# Patient Record
Sex: MALE | Race: WHITE | NOT HISPANIC OR LATINO | Employment: OTHER
[De-identification: names, ages, dates, MRNs, and addresses within clinical notes are randomized per-mention and may not be internally consistent; named-entity substitution may affect disease eponyms.]

---

## 2022-05-06 ENCOUNTER — TRANSCRIBE ORDERS (OUTPATIENT)
Dept: OTHER | Age: 67
End: 2022-05-06
Payer: COMMERCIAL

## 2022-05-06 DIAGNOSIS — R63.4 WEIGHT LOSS OF MORE THAN 10% BODY WEIGHT: Primary | ICD-10-CM

## 2022-05-06 DIAGNOSIS — R11.10 REGURGITATION OF FOOD: ICD-10-CM

## 2022-05-06 DIAGNOSIS — K22.0 ACHALASIA: ICD-10-CM

## 2022-05-09 ENCOUNTER — TELEPHONE (OUTPATIENT)
Dept: GASTROENTEROLOGY | Facility: CLINIC | Age: 67
End: 2022-05-09
Payer: COMMERCIAL

## 2022-05-09 NOTE — TELEPHONE ENCOUNTER
[facility]    Phone Message    May a detailed message be left on voicemail: yes     Reason for Call: Other: New patient // scheduled for 11-1 // weight loss over 10% of body weight per referral - patient unaware of how much lost in what time period      Action Taken: Message routed to:  [facility] ([facility] GI    Travel Screening: Not Applicable

## 2022-05-12 NOTE — TELEPHONE ENCOUNTER
Chart review office note from 5/5    Lisandro is a 67 year old. The primary encounter diagnosis was Regurgitation of food. Diagnoses of Esophageal disorder, Achalasia, Weight loss of more than 10% body weight, Leg wound, right, initial encounter, and Bronchitis were also pertinent to this visit.     1. Regurgitation of food  2. Esophageal disorder  3. Achalasia  4. Weight loss of more than 10% body weight  Presentation initially concerning for malignancy. CT chest abdomen pelvis did not reveal evidence of mass. Severely dilated distal esophagus. Looking back in his chart, it appears he was diagnosed with Achalasia back in 2011 by Dr. Willard in [place]. I spoke with Dr. Mohs, general surgery, about performing endoscopy but he referred me to Gastroenterology. I then spoke with Dr. Salgado, Gastroenterology, who stated that pt would likely need a myotomy and to refer urgently to [facility] for work-up and treatment.     Referral order placed for [facility].

## 2022-05-16 ENCOUNTER — TELEPHONE (OUTPATIENT)
Dept: GASTROENTEROLOGY | Facility: CLINIC | Age: 67
End: 2022-05-16
Payer: COMMERCIAL

## 2022-05-16 NOTE — TELEPHONE ENCOUNTER
LVM with pod number. Pt's 11/1 appt with Dr. Grace can be rescheduled to 5/26 at 2:40pm, OK per RN. Slot is held for patient.  (no mychart)

## 2022-05-17 ENCOUNTER — TELEPHONE (OUTPATIENT)
Dept: GASTROENTEROLOGY | Facility: CLINIC | Age: 67
End: 2022-05-17
Payer: COMMERCIAL

## 2022-05-17 NOTE — TELEPHONE ENCOUNTER
Called to remind patient of their upcoming appointment with our GI clinic, on Thursday 5/26/2022 at 2:40PM with Dr. Grace. This appointment is scheduled as a video visit. You will receive a call approximately 30 minutes prior to check you in, you must be in [place] for this visit., if your appointment is virtual (video or telephone) you need to be in [place] for the visit. To reschedule or cancel patient to call [phone].    Nyla Monroy MA

## 2022-05-18 ENCOUNTER — TELEPHONE (OUTPATIENT)
Dept: GASTROENTEROLOGY | Facility: CLINIC | Age: 67
End: 2022-05-18
Payer: COMMERCIAL

## 2022-05-18 NOTE — TELEPHONE ENCOUNTER
Called to remind patient of their upcoming appointment with our GI clinic, on Thursday 5/26/2022 at 2:40PM with Dr. Grace. This appointment is scheduled as a video visit. You will receive a call approximately 30 minutes prior to check you in, you must be in [place] for this visit., if your appointment is virtual (video or telephone) you need to be in [place] for the visit. To reschedule or cancel patient to call [phone].     Nyla Monroy MA

## 2022-05-18 NOTE — TELEPHONE ENCOUNTER
REFERRAL INFORMATION:    Referring Provider:  Ginger    Referring Clinic:  [facility]    Reason for Visit/Diagnosis: achalasia, weight loss     FUTURE VISIT INFORMATION:    Appointment Date: 5.26.22    Appointment Time: 2:40 PM     NOTES STATUS DETAILS   OFFICE NOTE from Referring Provider Care Everywhere 5.6.22[facility]   OFFICE NOTE from Other Specialist Care Everywhere 5.12.22 [facility]   HOSPITAL DISCHARGE SUMMARY/  ED VISITS Care Everywhere 5.12.22 [facility]   OPERATIVE REPORT N/A    MEDICATION LIST Care Everywhere         ENDOSCOPY  Care Everywhere 5.13.22 EGD  10.22.20 [facility]   COLONOSCOPY N/A    ERCP N/A    EUS N/A    STOOL TESTING N/A    PERTINENT LABS Care Everywhere    PATHOLOGY REPORTS (RELATED) N/A    IMAGING (CT, MRI, EGD, MRCP, Small Bowel Follow Through/SBT, MR/CT Enterography) Received 5.13.22, 5.13.22 XR esophagram, [facility]  5.5.22 CT chest ab pelvis, [facility]  8.31.20 CT ab pelvis, [facility]     Action 5.18.22 MJ   Action Taken Requested images from [facility]     Action 5.25.22 MJ   Action Taken Many images pushed to PACS. Called film room to have images pulled into PACS.

## 2022-05-19 ENCOUNTER — PATIENT OUTREACH (OUTPATIENT)
Dept: GASTROENTEROLOGY | Facility: CLINIC | Age: 67
End: 2022-05-19
Payer: COMMERCIAL

## 2022-05-19 NOTE — TELEPHONE ENCOUNTER
Reached out to pt per clinic staff to let them know that they will not need to complete any testing prior to their appt with Dr Grace on 5/26  Full understanding verbalized

## 2022-05-26 ENCOUNTER — VIRTUAL VISIT (OUTPATIENT)
Dept: GASTROENTEROLOGY | Facility: CLINIC | Age: 67
End: 2022-05-26
Attending: PHYSICIAN ASSISTANT
Payer: COMMERCIAL

## 2022-05-26 ENCOUNTER — PRE VISIT (OUTPATIENT)
Dept: GASTROENTEROLOGY | Facility: CLINIC | Age: 67
End: 2022-05-26

## 2022-05-26 VITALS — HEART RATE: 90 BPM | SYSTOLIC BLOOD PRESSURE: 98 MMHG | WEIGHT: 205 LBS | DIASTOLIC BLOOD PRESSURE: 64 MMHG

## 2022-05-26 DIAGNOSIS — R11.10 REGURGITATION OF FOOD: ICD-10-CM

## 2022-05-26 DIAGNOSIS — R63.4 WEIGHT LOSS OF MORE THAN 10% BODY WEIGHT: ICD-10-CM

## 2022-05-26 DIAGNOSIS — K22.0 ACHALASIA: ICD-10-CM

## 2022-05-26 DIAGNOSIS — R13.19 ESOPHAGEAL DYSPHAGIA: Primary | ICD-10-CM

## 2022-05-26 PROCEDURE — 99205 OFFICE O/P NEW HI 60 MIN: CPT | Mod: 95 | Performed by: INTERNAL MEDICINE

## 2022-05-26 RX ORDER — DILTIAZEM HCL 60 MG
60 TABLET ORAL 3 TIMES DAILY
COMMUNITY
Start: 2022-05-17

## 2022-05-26 RX ORDER — PHENOL 1.4 %
10 AEROSOL, SPRAY (ML) MUCOUS MEMBRANE 2 TIMES DAILY
COMMUNITY

## 2022-05-26 RX ORDER — DULOXETIN HYDROCHLORIDE 60 MG/1
60 CAPSULE, DELAYED RELEASE ORAL DAILY
COMMUNITY
Start: 2022-05-19 | End: 2023-04-20

## 2022-05-26 ASSESSMENT — PAIN SCALES - GENERAL: PAINLEVEL: NO PAIN (0)

## 2022-05-26 NOTE — PROGRESS NOTES
"Lisandro is a 67 year old who is being evaluated via a billable video visit.      How would you like to obtain your AVS? MyChart  If the video visit is dropped, the invitation should be resent by: Send to e-mail at: [email or web address]  Will anyone else be joining your video visit? No      Video Start Time: 2:18 PM  Video-Visit Details    Type of service:  Video Visit    Video End Time:3:08 PM    Originating Location (pt. Location): Home    Distant Location (provider location):  [facility] [place]     Platform used for Video Visit: App[employer]     Gastroenterology Visit for: Lisandro Damon 1955   MRN: [id number]     Reason for Visit:  chief complaint    Referred by: [place]  / No address on file  No care team member to display    History of Present Illness:   Lisandro Damon is a 67 year old male with DVT 2013, cardiomyopathy, paroxismal afib, HTN, anxiety/depression,  who is presenting as a new patient in consultation at the request of Dr. Cannon with a chief complaint of dysphagia, regurgitation, reported achalasia.  ---------------------------------------------------------------  Lisandro Damon states through his wife. In 2011 they feel symptoms started. He began having trouble swallowing but it was \"not terrible\". At that time he was told he had achalasia. He had different medications over the years. Has been recently having increasing regurgitation of all contents and recent aspiration pneumonias requiring hospitalization. Has to take pills carefully to not have them become stuck. If he wasn't avoiding solid foods he would have more frequent regurgitation. He currently avoids all solid food due to the dysphagia and regurgitation associated.    The HRM machine is broken locally per the patient's wife's report. Feels dilation was marginally helpful in terms of being able to get liquid nutrition down.   ---------------------------------------------------------------     Lisandro Damon" denies  odynophagia, heartburn/reflux, nausea, vomiting, early satiety, abdominal pain, abdominal distension/bloating, diarrhea, constipation, hematochezia, or melena. No unintentional weight loss.     Wt Readings from Last 5 Encounters:   05/26/22 93 kg (205 lb)        Esophageal Questionnaire(s)    BEDQ Questionnaire  BEDQ Questionnaire: How Often Have You Had the Following? 5/26/2022   Trouble eating solid food (meat, bread, vegetables) 5.0   Trouble eating soft foods (yogurt, jello, pudding) 0   Trouble swallowing liquids 0   Pain while swallowing 0   Coughing or choking while swallowing foods or liquids 0   Total Score: 5     BEDQ Questionnaire: Discomfort/Pain Ratings 5/26/2022   Eating solid food (meat, bread, vegetables) 5.0   Eating soft foods (yogurt, jello, pudding) 0   Drinking liquid 0   Total Score: 5       Eckardt Questionnaire  Eckardt Questionnaire 5/26/2022   Dysphagia 3   Regurgitation 1   Retrosternal Pain 0   Weight Loss (kg) 3   Total Score:  7       Promis 10 Questionnaire  No flowsheet data found.        STUDIES & PROCEDURES:    EGD:   Date: 5/13/22  Impression:  The gastroscope was then passed entirely under direct vision. His proximal and distal esophagus were remarkable for a slight stricture that I was able to pass the scope through without much difficulty. His distal stomach revealed a gastritis that did obtain an antral biopsy, as well as a biopsy for a ARCHIE test. His 1st and 2nd portions of the duodenum were normal. I withdrew the scope back into the stomach, tried multiple times to pass the balloon dilator through the scope, even switched scopes, switched dilators, was unable to do that no matter what I tried, so I ended up dilating it with some bougies up to a 54-Wolof bougie. This was done gently and without difficulty. I reinserted the scope, he had no apparent injury to his esophagus. However, I am recommending a Gastroview swallow just to make sure he has no esophageal  injury.     PROCEDURE: Esophagogastroduodenoscopy with biopsy of the antrum, as well as biopsy for ARCHIE test and a bougie dilation of his esophageal stricture secondary to the balloons being defective.     Pathology Report:    Colonoscopy:  Date:  Impression:  Pathology Report:     EndoFLIP directed at the UES or LES (8cm (EF-325) balloon length or 16cm (EF-322) balloon length):   Date:  8cm balloon  Balloon inflation Balloon pressure CSA (mm^2) DI (mm^2/mmHg) Dmin (mm) Compliance   20 (ladmark ID)        30        40        50           16cm balloon  Balloon inflation Balloon pressure CSA (mm^2) DI (mm^2/mmHg) Dmin (mm) Compliance   30 (ladmark ID)        40        50        60        70           High Resolution Manometry:  Date:  Impression:    PH/Impedance:  Date:  Impression:     Bravo:  48 or 96hr  Date:  Impression:    CT:  Date: 5/5/22  Impression:  LUNGS AND LARGE AIRWAYS: Presumed mucous within the trachea. Diffuse bronchial wall thickening both lungs suggests bronchitis/bronchiolitis. Mild reticulonodular tree-in-bud opacities both lungs suggest sequela of airways inflammation/infection. Aspiration is a possibility.    PLEURA: Normal. No pleural effusion or thickening.    HEART AND PERICARDIUM: Borderline heart size. Coronary artery calcification.    MEDIASTINUM AND YULI: Fluid distended esophagus extending to the distal esophagus. Consider endoscopy for further evaluation.    CHEST WALL AND LOWER NECK: Bilateral gynecomastia.    VESSELS: Scattered arterial calcifications.    BONES: Diffuse osteopenia. Degenerative changes thoracic spine and both shoulders.      ABDOMEN/PELVIS:    LIVER: Normal.    GALLBLADDER AND BILIARY TREE: Cholecystectomy.    PANCREAS: Normal.    SPLEEN: Normal.    ADRENALS: Normal.    KIDNEYS AND URETERS: Normal.    BLADDER: Calcification involving the bladder likely related to bladder stone. If indicated, urology consultation could be considered.    REPRODUCTIVE ORGANS: Moderate  prostate enlargement.    BOWEL: Normal appendix. Normal caliber small bowel and colon.    PERITONEUM: No ascites or free air. No other fluid collection.    VESSELS: Atheromatous plaque involving the abdominal aorta and branch vessels.    RETROPERITONEUM: Normal.    ABDOMINAL WALL: Normal.    BONES: Postoperative changes kyphoplasty at L1 and L2 about moderate and severe compression deformities respectively. Left DIANELYS. Degenerative changes lumbar spine, SI joints and right hip.    LYMPH NODES:    --RETROPERITONEAL: No enlarged retroperitoneal lymph nodes.    --MESENTERIC: No enlarged mesenteric lymph nodes.    --PELVIC/INGUINAL: No enlarged pelvic lymph nodes.        IMPRESSION:   1. Diffuse bronchial wall thickening both lungs. Along with scattered reticulonodular tree-in-bud interstitial prominence, findings suggest airways inflammation or infection. Accounting for fluid distention of the esophagus, aspiration is a possibility.  2. Fluid distended esophagus extending to the distal esophagus. Recommend endoscopy for further evaluation.  3. Additional findings as above.    Esophagram:  Date:    Prior medical records were reviewed including, but not limited to, notes from referring providers, lab work, radiographic tests, and other diagnostic tests. Pertinent results were summarized above.     History   No past medical history on file.    No past surgical history on file.    Social History     Socioeconomic History     Marital status:      Spouse name: Not on file     Number of children: Not on file     Years of education: Not on file     Highest education level: Not on file   Occupational History     Not on file   Tobacco Use     Smoking status: Never Smoker     Smokeless tobacco: Former User     Quit date: 5/2/2011   Substance and Sexual Activity     Alcohol use: Not on file     Drug use: Not on file     Sexual activity: Not on file   Other Topics Concern     Not on file   Social History Narrative     Not on  file     Social Determinants of Health     Financial Resource Strain: Not on file   Food Insecurity: Not on file   Transportation Needs: Not on file   Physical Activity: Not on file   Stress: Not on file   Social Connections: Not on file   Intimate Partner Violence: Not on file   Housing Stability: Not on file       No family history on file.  Family history reviewed and edited as appropriate    Medications and Allergies:     Outpatient Encounter Medications as of 5/26/2022   Medication Sig Dispense Refill     aspirin (ASA) 81 MG EC tablet Take 81 mg by mouth daily       diclofenac (VOLTAREN) 1 % topical gel Apply 4 g topically       diltiazem (CARDIZEM) 60 MG tablet Take 60 mg by mouth 3 times daily       DULoxetine (CYMBALTA) 60 MG capsule Take 60 mg by mouth daily       magnesium chloride 535 (64 Mg) MG TBEC CR tablet Take 64 mg by mouth daily       Melatonin 10 MG TABS tablet Take 10 mg by mouth 2 times daily       No facility-administered encounter medications on file as of 5/26/2022.        No Known Allergies     Review of systems:  A full 10 point review of systems was obtained and was negative except for the pertinent positives and negatives stated within the HPI.    Objective Findings:   Physical Exam:    Constitutional: BP 98/64   Pulse 90   Wt 93 kg (205 lb)   General: Alert, cooperative, no distress, well-appearing  Head: Atraumatic, normocephalic, no obvious abnormalities   Eyes: EOMI, Sclera anicteric, no obvious conjunctival hemorrhage   Nose: Nares normal, no obvious malformation, no obvious rhinorrhea   Respiratory: normal appearing respirations, no cough  Musculoskeletal: Range of motion intact, no obvious strength deficit  Skin: No jaundice, no obvious rash  Neurologic: AAOx3, no obvious neurologic abnormality  Psychiatric: blunted Affect, appropriate mood, defers to wife to relay his HPI  Extremities: No obvious edema, no obvious malformation     Labs, Radiology, Pathology     No results found  for: WBC, HGB, PLT, CHOL, TRIG, HDL, LDLDIRECT, ALT, AST, NA, CREATININE, BUN, CO2, TSH, INR, GLUF     Liver Function Studies - No results for input(s): PROTTOTAL, ALBUMIN, BILITOTAL, ALKPHOS, AST, ALT, BILIDIRECT in the last 34120 hours.       Patient Active Problem List    Diagnosis Date Noted     Weight loss of more than 10% body weight 05/26/2022     Priority: Medium     Regurgitation of food 05/26/2022     Priority: Medium     Achalasia 05/26/2022     Priority: Medium     Esophageal dysphagia 05/26/2022     Priority: Medium      Assessment and Plan   Assessment:    Lisandro Damon is a 67 year old male with DVT 2013, cardiomyopathy, paroxismal afib, HTN, anxiety/depression,  who is presenting as a new patient in consultation at the request of Dr. Cannon with a chief complaint of dysphagia, regurgitation, reported achalasia    The patient was seen in video telemedicine consultation regarding his symptoms of dysphagia to solids, regurgitation, and reported prior diagnosis of achalasia     The pathophysiology of achalasia was explained as were the subtypes (I, II, and III). The evaluation with high resolution esophageal manometry was described as was the possible need for endoFLIP. The different treatment options were explained in detail including otulinum toxin injection, pneumatic dilation, hydrostatic dilation (EsoFLIP), laparoscopic heller myotomy (LHM), and per-oral endoscopic myotomy (POEM).    In order to best assess next steps in management the patient will require relatively urgent high resolution manometry due to his aspiration events. We will coordinate the manometry with him and his wife to ensure this fits their schedule. Assuming achalasia is confirmed, the appropriate referral will be placed for POEM or LHM.     Note* pseudoachalasia has been ruled out with a CT scan performed earlier this month.     1. Esophageal dysphagia    2. Regurgitation of food    3. Weight loss of more than 10% body weight     4. Achalasia       Orders Placed This Encounter   Procedures     Adult Gastro Ref - Procedure Only          Plan:  1. Please obtain high resolution esophageal manometry here at the [facility]. This will be important to characterize your esophageal disorder (achalasia I, II, or III, or other motility abnormality)  2. Possible treatment options for achalasia include: botulinum toxin injection, pneumatic dilation, hydrostatic dilation (EsoFLIP), laparoscopic heller myotomy (LHM), and per-oral endoscopic myotomy (POEM). Here is a helpful link describing POEM: https://[email or web address]. Please take your time to review these possible treatment options.     Follow up plan:   Return to clinic 3 months and as needed.    The risks and benefits of my recommendations, as well as other treatment options were discussed with the patient and any available family today. All questions were answered.     o Follow up: As planned above. Today, I personally spent 50 minutes in direct face to face time with the patient, of which greater than 50% of the time was spent in patient education and counseling as described above. Approximately 20 minutes were spent on indirect care associated with the patient's consultation including but not limited to review of: patient medical records to date, clinic visits, hospital records, lab results, imaging studies, procedural documentation, and coordinating care with other providers. The findings from this review are summarized in the above note. All of the above accounted for a cumulative time of 70 minutes and was performed on the date of service.     The patient verbalized understanding of the plan and was appreciative for the time spent and information provided during the office visit.     Author:   Joaquin Grace DO   of Medicine  Director, Esophageal Disorders Program  Division of Gastroenterology, Hepatology, and Nutrition  [facility]  [place]     Documentation assisted by voice recognition and documentation system.

## 2022-05-26 NOTE — PATIENT INSTRUCTIONS
It was a pleasure taking care of you today.  I've included a brief summary of our discussion and care plan from today's visit below.  Please review this information with your primary care provider.  _______________________________________________________________________    My recommendations are summarized as follows:    Please obtain high resolution esophageal manometry here at the [facility]. This will be important to characterize your esophageal disorder (achalasia I, II, or III, or other motility abnormality)  Possible treatment options for achalasia include: botulinum toxin injection, pneumatic dilation, hydrostatic dilation (EsoFLIP), laparoscopic heller myotomy (LHM), and per-oral endoscopic myotomy (POEM). Here is a helpful link describing POEM: https://[email or web address]. Please take your time to review these possible treatment options.       To schedule endoscopic procedures you may call: [phone]  To schedule radiology tests you may call: [phone]  To schedule an ENT appointment you may call: [phone]    Please call my nurse Luiza ([phone])Katie ([phone]) with any questions or concerns.  If you were seen through the [facility] please feel free to reach out to Domi at [phone]   --    Return to GI Clinic in 3 months to review your progress.    _______________________________________________________________________    Who do I call with any questions after my visit?  Please be in touch if there are any further questions that arise following today's visit.  There are multiple ways to contact your gastroenterology care team.      During business hours, you may reach a Gastroenterology nurse at [phone] and choose option 3.       To schedule or reschedule an appointment, please call [phone].     You can always send a secure message through [employer].  [employer] messages are answered by your nurse or doctor typically within 24 hours.  Please allow extra  time on weekends and holidays.      For urgent/emergent questions after business hours, you may reach the on-call GI Fellow by contacting the [facility]  at [phone].     How will I get the results of any tests ordered?    You will receive all of your results.  If you have signed up for [employer], any tests ordered at your visit will be available to you after your physician reviews them.  Typically this takes 1-2 weeks.  If there are urgent results that require a change in your care plan, your physician or nurse will call you to discuss the next steps.      What is [employer]ip[employer] Art[employer]art?  Mustbin is a secure way for you to access all of your healthcare records from the [facility].  It is a web based computer program, so you can sign on to it from any location.  It also allows you to send secure messages to your care team.  I recommend signing up for [employer] access if you have not already done so and are comfortable with using a computer.      How to I schedule a follow-up visit?  If you did not schedule a follow-up visit today, please call [phone] to schedule a follow-up office visit.      If you feel you received exceptional care and are interested in supporting the clinical and research goals of Dr. Grace or the Division of Gastroenterology, Hepatology, and Nutrition please contact [email or web address] from the [facility] to discuss opportunities to donate.    Sincerely,    Joaquin Grace DO   of Medicine  Director, Esophageal Disorders Program  Division of Gastroenterology, Hepatology, and Nutrition  [facility]

## 2022-05-29 ENCOUNTER — HEALTH MAINTENANCE LETTER (OUTPATIENT)
Age: 67
End: 2022-05-29

## 2022-06-08 ENCOUNTER — PATIENT OUTREACH (OUTPATIENT)
Dept: GASTROENTEROLOGY | Facility: CLINIC | Age: 67
End: 2022-06-08
Payer: COMMERCIAL

## 2022-06-08 NOTE — TELEPHONE ENCOUNTER
Reached out to pt per provider to assist in scheduling esophageal manometry.  Pt requesting to schedule in August right before their follow up visit.  They will cancel this if they are able to have the test done in [place].  Transferred pt and spouse to endoscopy scheduling for making the appointment.

## 2022-06-23 ENCOUNTER — PATIENT OUTREACH (OUTPATIENT)
Dept: GASTROENTEROLOGY | Facility: CLINIC | Age: 67
End: 2022-06-23

## 2022-06-23 NOTE — PROGRESS NOTES
Reached out to pt to offer sooner esophageal manometry appointment per care plan. Pt declines and will keep August appointment.

## 2022-07-15 ENCOUNTER — TELEPHONE (OUTPATIENT)
Dept: GASTROENTEROLOGY | Facility: CLINIC | Age: 67
End: 2022-07-15

## 2022-07-15 NOTE — TELEPHONE ENCOUNTER
Spoke with Lisandro's spouse as I noticed he cancelled his 8/16 manomtery test. She stated he was able to get this done next Tuesday 7/19 in [place] at [facility] which is much closer for them to do then drive to [facility] to have done.

## 2022-07-19 ENCOUNTER — TRANSFERRED RECORDS (OUTPATIENT)
Dept: HEALTH INFORMATION MANAGEMENT | Facility: CLINIC | Age: 67
End: 2022-07-19

## 2022-08-29 ENCOUNTER — VIRTUAL VISIT (OUTPATIENT)
Dept: GASTROENTEROLOGY | Facility: CLINIC | Age: 67
End: 2022-08-29
Payer: COMMERCIAL

## 2022-08-29 DIAGNOSIS — R13.19 ESOPHAGEAL DYSPHAGIA: Primary | ICD-10-CM

## 2022-08-29 PROCEDURE — 99213 OFFICE O/P EST LOW 20 MIN: CPT | Mod: 95 | Performed by: INTERNAL MEDICINE

## 2022-08-29 NOTE — PATIENT INSTRUCTIONS
It was a pleasure taking care of you today.  I've included a brief summary of our discussion and care plan from today's visit below.  Please review this information with your primary care provider.  _______________________________________________________________________    My recommendations are summarized as follows:    Please schedule an upper endoscopy with endoFLIP  We will try to obtain the report of your manometry in the meantime  If you have achalasia you may wish to consider the heller myotomy vs POEM.   Possible treatment options for achalasia include: botulinum toxin injection, pneumatic dilation, hydrostatic dilation (EsoFLIP), laparoscopic heller myotomy (LHM), and per-oral endoscopic myotomy (POEM). Here is a helpful link describing POEM: https://[email or web address]. Please take your time to review these possible treatment options.       To schedule endoscopic procedures you may call: [phone]  To schedule radiology tests you may call: [phone]  To schedule an ENT appointment you may call: [phone]    Please call my nurse Luiza ([phone])Katie ([phone]) with any questions or concerns.  If you were seen through the [facility] please feel free to reach out to Domi at [phone]   --    Return to GI Clinic in 4 months to review your progress.    _______________________________________________________________________    Who do I call with any questions after my visit?  Please be in touch if there are any further questions that arise following today's visit.  There are multiple ways to contact your gastroenterology care team.      During business hours, you may reach a Gastroenterology nurse at [phone] and choose option 3.       To schedule or reschedule an appointment, please call [phone].     You can always send a secure message through Baloon[employer].  [employer] messages are answered by your nurse or doctor typically within 24 hours.  Please allow extra time on weekends  and holidays.      For urgent/emergent questions after business hours, you may reach the on-call GI Fellow by contacting the [facility]  at [phone].     How will I get the results of any tests ordered?    You will receive all of your results.  If you have signed up for [employer], any tests ordered at your visit will be available to you after your physician reviews them.  Typically this takes 1-2 weeks.  If there are urgent results that require a change in your care plan, your physician or nurse will call you to discuss the next steps.      What is [employer]?  Gen9 is a secure way for you to access all of your healthcare records from the [facility].  It is a web based computer program, so you can sign on to it from any location.  It also allows you to send secure messages to your care team.  I recommend signing up for Gen9 access if you have not already done so and are comfortable with using a computer.      How to I schedule a follow-up visit?  If you did not schedule a follow-up visit today, please call [phone] to schedule a follow-up office visit.      If you feel you received exceptional care and are interested in supporting the clinical and research goals of Dr. Grace or the Division of Gastroenterology, Hepatology, and Nutrition please contact [email or web address] from the [facility] to discuss opportunities to donate.    Sincerely,    Joaquin Grace DO   of Medicine  Director, Esophageal Disorders Program  Division of Gastroenterology, Hepatology, and Nutrition  [facility]

## 2022-08-29 NOTE — PROGRESS NOTES
"Lisandro is a 67 year old who is being evaluated via a billable telephone visit.      What phone number would you like to be contacted at? [phone]  How would you like to obtain your AVS? Omar  Phone call duration: 18 minutes      Gastroenterology Visit for: Lisandro Damon 1955   MRN: [id number]     Reason for Visit:  chief complaint    Referred by: Banquete  / No address on file  Patient Care Team:  Joaquin Grace DO as Assigned Gastroenterology Provider    History of Present Illness:   Lisandro Damon is a 67 year old male with DVT 2013, cardiomyopathy, paroxismal afib, HTN, anxiety/depression,  who is presenting as a follow up patient in consultation at the request of Dr. Cannon with a chief complaint of dysphagia, regurgitation, reported achalasia.    8/29/22  Patient did not communicate significantly. His wife did most of the speaking with minimal input from the patient. He underwent manometry and reports that all swallows were upright however documentation suggests that he was in the supine position (head of bed 45 degree angle). He has no meaningful change in symptoms. See BEDQ and Eckardt score below.   ---------------------------------------------------------------  5/26/22  Lisandro Damon states through his wife. In 2011 they feel symptoms started. He began having trouble swallowing but it was \"not terrible\". At that time he was told he had achalasia. He had different medications over the years. Has been recently having increasing regurgitation of all contents and recent aspiration pneumonias requiring hospitalization. Has to take pills carefully to not have them become stuck. If he wasn't avoiding solid foods he would have more frequent regurgitation. He currently avoids all solid food due to the dysphagia and regurgitation associated.    The HRM machine is broken locally per the patient's wife's report. Feels dilation was marginally helpful in terms of being able to get liquid nutrition down. "   ---------------------------------------------------------------     Lisandro Damon denies  odynophagia, heartburn/reflux, nausea, vomiting, early satiety, abdominal pain, abdominal distension/bloating, diarrhea, constipation, hematochezia, or melena. No unintentional weight loss.     Wt Readings from Last 5 Encounters:   05/26/22 93 kg (205 lb)        Esophageal Questionnaire(s)    BEDQ Questionnaire  BEDQ Questionnaire: How Often Have You Had the Following? 5/26/2022 8/29/2022   Trouble eating solid food (meat, bread, vegetables) 5.0 5.0   Trouble eating soft foods (yogurt, jello, pudding) 0 0   Trouble swallowing liquids 0 0   Pain while swallowing 0 0   Coughing or choking while swallowing foods or liquids 0 0   Total Score: 5 5     BEDQ Questionnaire: Discomfort/Pain Ratings 5/26/2022 8/29/2022   Eating solid food (meat, bread, vegetables) 5.0 5.0   Eating soft foods (yogurt, jello, pudding) 0 0   Drinking liquid 0 0   Total Score: 5 5       Eckardt Questionnaire  Eckardt Questionnaire 5/26/2022 8/29/2022   Dysphagia 3 3   Regurgitation 1 2   Retrosternal Pain 0 0   Weight Loss (kg) 3 3   Total Score:  7 8       Promis 10 Questionnaire  No flowsheet data found.        STUDIES & PROCEDURES:    EGD:   Date: 5/13/22  Impression:  The gastroscope was then passed entirely under direct vision. His proximal and distal esophagus were remarkable for a slight stricture that I was able to pass the scope through without much difficulty. His distal stomach revealed a gastritis that did obtain an antral biopsy, as well as a biopsy for a ARCHIE test. His 1st and 2nd portions of the duodenum were normal. I withdrew the scope back into the stomach, tried multiple times to pass the balloon dilator through the scope, even switched scopes, switched dilators, was unable to do that no matter what I tried, so I ended up dilating it with some bougies up to a 54-Armenian bougie. This was done gently and without difficulty. I reinserted the  scope, he had no apparent injury to his esophagus. However, I am recommending a Gastroview swallow just to make sure he has no esophageal injury.     PROCEDURE: Esophagogastroduodenoscopy with biopsy of the antrum, as well as biopsy for ARCHIE test and a bougie dilation of his esophageal stricture secondary to the balloons being defective.     Pathology Report:    Colonoscopy:  Date:  Impression:  Pathology Report:     EndoFLIP directed at the UES or LES (8cm (EF-325) balloon length or 16cm (EF-322) balloon length):   Date:  8cm balloon  Balloon inflation Balloon pressure CSA (mm^2) DI (mm^2/mmHg) Dmin (mm) Compliance   20 (ladmark ID)        30        40        50           16cm balloon  Balloon inflation Balloon pressure CSA (mm^2) DI (mm^2/mmHg) Dmin (mm) Compliance   30 (ladmark ID)        40        50        60        70           High Resolution Manometry:  Date: 7/19/22  Impression:  Lower esophageal sphincter residual pressure: 12.4 mm Hg (normal   <15.0).    - Absent esophageal peristalsis and every swallow and borderline lower   esophageal sphincter relaxation. Although achalasia is typically   associated with higher LES relaxation pressures, this could represent   achalasia.    Recommendation:  - Consider surgical referral for Heller myotomy versus lower esophageal   sphincter Botox injection.    Dr. Costa Enrique    PH/Impedance:  Date:  Impression:     Bravo:  48 or 96hr  Date:  Impression:    CT:  Date: 5/5/22  Impression:  LUNGS AND LARGE AIRWAYS: Presumed mucous within the trachea. Diffuse bronchial wall thickening both lungs suggests bronchitis/bronchiolitis. Mild reticulonodular tree-in-bud opacities both lungs suggest sequela of airways inflammation/infection. Aspiration is a possibility.    PLEURA: Normal. No pleural effusion or thickening.    HEART AND PERICARDIUM: Borderline heart size. Coronary artery calcification.    MEDIASTINUM AND YULI: Fluid distended esophagus extending to the distal  esophagus. Consider endoscopy for further evaluation.    CHEST WALL AND LOWER NECK: Bilateral gynecomastia.    VESSELS: Scattered arterial calcifications.    BONES: Diffuse osteopenia. Degenerative changes thoracic spine and both shoulders.      ABDOMEN/PELVIS:    LIVER: Normal.    GALLBLADDER AND BILIARY TREE: Cholecystectomy.    PANCREAS: Normal.    SPLEEN: Normal.    ADRENALS: Normal.    KIDNEYS AND URETERS: Normal.    BLADDER: Calcification involving the bladder likely related to bladder stone. If indicated, urology consultation could be considered.    REPRODUCTIVE ORGANS: Moderate prostate enlargement.    BOWEL: Normal appendix. Normal caliber small bowel and colon.    PERITONEUM: No ascites or free air. No other fluid collection.    VESSELS: Atheromatous plaque involving the abdominal aorta and branch vessels.    RETROPERITONEUM: Normal.    ABDOMINAL WALL: Normal.    BONES: Postoperative changes kyphoplasty at L1 and L2 about moderate and severe compression deformities respectively. Left DIANELYS. Degenerative changes lumbar spine, SI joints and right hip.    LYMPH NODES:    --RETROPERITONEAL: No enlarged retroperitoneal lymph nodes.    --MESENTERIC: No enlarged mesenteric lymph nodes.    --PELVIC/INGUINAL: No enlarged pelvic lymph nodes.        IMPRESSION:   1. Diffuse bronchial wall thickening both lungs. Along with scattered reticulonodular tree-in-bud interstitial prominence, findings suggest airways inflammation or infection. Accounting for fluid distention of the esophagus, aspiration is a possibility.  2. Fluid distended esophagus extending to the distal esophagus. Recommend endoscopy for further evaluation.  3. Additional findings as above.    Esophagram:  Date: 5/13/22    FINDINGS: Distal esophagus at the GE junction is severely narrowed with little contrast passing from the distal esophagus into the stomach. Esophagus is distended with multiple tertiary contractions/diffuse esophageal spasm. Achalasia  could have this appearance. A distal esophageal infiltrating process accounting for stricture is a possibility. Recommend endoscopy. Reflux from the distal esophagus into the cervical esophagus with associated aspiration was noted during the study. Reticulonodular opacities in both lungs suspicious for sequela of infection/aspiration.    IMPRESSION: Severe distal esophageal narrowing at the GE junction with esophageal dilation. Findings can be associated with achalasia or infiltrative process at the GE junction. Severe tertiary contractions/esophageal spasm as above. Recommend endoscopy.    Dictated By: Dr. John Lopez 5/13/2022 10:18 AM  Edited By: CA 5/13/2022 10:34 AM    Electronically Signed: Dr. John Lopez 5/13/2022 10:42 AM    XR ESOPHAGRAM WO AIR    CLINICAL INFORMATION: Post-dilation.    COMPARISON: Earlier esophagram same day.    FINDINGS: No evidence for esophageal leak post dilation. Again seen are multiple tertiary contractions versus diffuse esophageal spasm involving the esophagus with delayed passage of contrast through the GE junction. The distal esophagus at the GE junction remains narrowed. There is however fluid visualized passing through the esophagus into the stomach on this study. Consider dietary consultation versus speech pathology consultation for recommendations on diet.      Prior medical records were reviewed including, but not limited to, notes from referring providers, lab work, radiographic tests, and other diagnostic tests. Pertinent results were summarized above.     History   No past medical history on file.    No past surgical history on file.    Social History     Socioeconomic History     Marital status:      Spouse name: Not on file     Number of children: Not on file     Years of education: Not on file     Highest education level: Not on file   Occupational History     Not on file   Tobacco Use     Smoking status: Never Smoker     Smokeless tobacco: Former User     Quit  date: 5/2/2011   Substance and Sexual Activity     Alcohol use: Not on file     Drug use: Not on file     Sexual activity: Not on file   Other Topics Concern     Not on file   Social History Narrative     Not on file     Social Determinants of Health     Financial Resource Strain: Not on file   Food Insecurity: Not on file   Transportation Needs: Not on file   Physical Activity: Not on file   Stress: Not on file   Social Connections: Not on file   Intimate Partner Violence: Not on file   Housing Stability: Not on file       No family history on file.  Family history reviewed and edited as appropriate    Medications and Allergies:     Outpatient Encounter Medications as of 8/29/2022   Medication Sig Dispense Refill     aspirin (ASA) 81 MG EC tablet Take 81 mg by mouth daily       diclofenac (VOLTAREN) 1 % topical gel Apply 4 g topically       diltiazem (CARDIZEM) 60 MG tablet Take 60 mg by mouth 3 times daily       DULoxetine (CYMBALTA) 60 MG capsule Take 60 mg by mouth daily       magnesium chloride 535 (64 Mg) MG TBEC CR tablet Take 64 mg by mouth daily       Melatonin 10 MG TABS tablet Take 10 mg by mouth 2 times daily       No facility-administered encounter medications on file as of 8/29/2022.        No Known Allergies     Review of systems:  A full 10 point review of systems was obtained and was negative except for the pertinent positives and negatives stated within the HPI.    Objective Findings:   Physical Exam:    Constitutional: There were no vitals taken for this visit.  Telephone consultation     Labs, Radiology, Pathology     No results found for: WBC, HGB, PLT, CHOL, TRIG, HDL, LDLDIRECT, ALT, AST, NA, CREATININE, BUN, CO2, TSH, INR, GLUF     Liver Function Studies - No results for input(s): PROTTOTAL, ALBUMIN, BILITOTAL, ALKPHOS, AST, ALT, BILIDIRECT in the last 29494 hours.       Patient Active Problem List    Diagnosis Date Noted     Weight loss of more than 10% body weight 05/26/2022     Priority:  Medium     Regurgitation of food 05/26/2022     Priority: Medium     Achalasia 05/26/2022     Priority: Medium     Esophageal dysphagia 05/26/2022     Priority: Medium      Assessment and Plan   Assessment:    Lisandro Damon is a 67 year old male with DVT 2013, cardiomyopathy, paroxismal afib, HTN, anxiety/depression,  who is presenting as a follow up patient in consultation at the request of Dr. Cannon with a chief complaint of dysphagia, regurgitation, reported achalasia    The patient was seen in telephone telemedicine consultation regarding his symptoms of dysphagia to solids, regurgitation, and reported prior diagnosis of achalasia     The patient has had no change in symptoms. Unfortunately he underwent manometry locally and I am unable to see the actual study. The importance of reviewing this was explained to the patient especially given the borderline IRP with absent contractility noted throughout the study based on the impression found on separate documentation. Because of the borderline study I have asked that he schedule endoscopy with endoflip to assess the EGJ further. He will likely require LHM vs POEM. The patient intimated that he would like to pursue endoscopy with endoflip locally. I have recommended that he come to the [facility] and I am uncertain if there is someone who is able to perform endoFLIP locally to him.     We will attempt to obtain the manometry study so that I may review the actual swallows and study data in greater detail.     No diagnosis found.   No orders of the defined types were placed in this encounter.     Plan:  1. Please schedule an upper endoscopy with endoFLIP  2. We will try to obtain the report of your manometry in the meantime  3. If you have achalasia you may wish to consider the heller myotomy vs POEM.   4. Possible treatment options for achalasia include: botulinum toxin injection, pneumatic dilation, hydrostatic dilation (EsoFLIP), laparoscopic heller  myotomy (LHM), and per-oral endoscopic myotomy (POEM). Here is a helpful link describing POEM: https://[email or web address].[email or web address]. Please take your time to review these possible treatment options.     Follow up plan:   Return to clinic 4 months and as needed.    The risks and benefits of my recommendations, as well as other treatment options were discussed with the patient and any available family today. All questions were answered.     o Follow up: As planned above. Today, I personally spent 18 minutes in directtelephone time with the patient, of which greater than 50% of the time was spent in patient education and counseling as described above. Approximately 10 minutes were spent on indirect care associated with the patient's consultation including but not limited to review of: patient medical records to date, clinic visits, hospital records, lab results, imaging studies, procedural documentation, and coordinating care with other providers. The findings from this review are summarized in the above note. All of the above accounted for a cumulative time of 28 minutes and was performed on the date of service.     The patient verbalized understanding of the plan and was appreciative for the time spent and information provided during the office visit.     Author:   Joaquin Grace DO   of Medicine  Director, Esophageal Disorders Program  Division of Gastroenterology, Hepatology, and Nutrition  [facility]     Documentation assisted by voice recognition and documentation system.

## 2022-08-30 ENCOUNTER — CARE COORDINATION (OUTPATIENT)
Dept: GASTROENTEROLOGY | Facility: CLINIC | Age: 67
End: 2022-08-30

## 2022-08-30 NOTE — PROGRESS NOTES
Reached out to [facility] regarding getting original color tracings of esophageal manometry for Dr Grace. Message sent to care team for call back.

## 2022-09-01 NOTE — PROGRESS NOTES
Received call back from   Vicki from [facility] for   [facility]  Relayed to pursue original color results of manometry.  [phone]  Spoke with RN in GI department, original color results in process of being sent via mail. RN will call back writer as needed.

## 2022-09-12 ENCOUNTER — CARE COORDINATION (OUTPATIENT)
Dept: GASTROENTEROLOGY | Facility: CLINIC | Age: 67
End: 2022-09-12

## 2022-09-12 NOTE — PROGRESS NOTES
Called out to [facility] again to learn if they are able to send color tracings of original Manometry test.    Spoke with HIM to have full color manometry report  [place] [facility] [place] Grace  Per records and endoscopy dept, records being sent via mail.

## 2022-09-21 ENCOUNTER — CARE COORDINATION (OUTPATIENT)
Dept: GASTROENTEROLOGY | Facility: CLINIC | Age: 67
End: 2022-09-21

## 2022-09-21 NOTE — PROGRESS NOTES
Reached out to [facility] medical records in [place], [place] again to check on progress of/re-request color full HRM report be mailed to clinic.  Reportedly mailed on 9/14  Also will emailed as a pdf to writer.  Writer will sent to HIM to upload and notify provider upon arrival.

## 2022-10-03 ENCOUNTER — HEALTH MAINTENANCE LETTER (OUTPATIENT)
Age: 67
End: 2022-10-03

## 2022-10-05 ENCOUNTER — DOCUMENTATION ONLY (OUTPATIENT)
Dept: GASTROENTEROLOGY | Facility: CLINIC | Age: 67
End: 2022-10-05

## 2022-10-05 NOTE — PROGRESS NOTES
Reviewed color manometry report which indicates a borderline IRP on all swallows and absent contractility. Proceed with plan for egd with endoflip.     Joaquin Grace DO   of Medicine  Director, Esophageal Disorders Program  Division of Gastroenterology, Hepatology, and Nutrition  [facility]

## 2023-06-04 ENCOUNTER — HEALTH MAINTENANCE LETTER (OUTPATIENT)
Age: 68
End: 2023-06-04

## 2024-07-28 ENCOUNTER — HEALTH MAINTENANCE LETTER (OUTPATIENT)
Age: 69
End: 2024-07-28

## 2025-08-10 ENCOUNTER — HEALTH MAINTENANCE LETTER (OUTPATIENT)
Age: 70
End: 2025-08-10